# Patient Record
Sex: FEMALE | URBAN - METROPOLITAN AREA
[De-identification: names, ages, dates, MRNs, and addresses within clinical notes are randomized per-mention and may not be internally consistent; named-entity substitution may affect disease eponyms.]

---

## 2017-02-16 ENCOUNTER — HOSPITAL ENCOUNTER (EMERGENCY)
Facility: CLINIC | Age: 42
Discharge: HOME | End: 2017-02-16
Attending: EMERGENCY MEDICINE | Admitting: EMERGENCY MEDICINE

## 2017-02-16 VITALS
SYSTOLIC BLOOD PRESSURE: 142 MMHG | WEIGHT: 184.08 LBS | TEMPERATURE: 97.5 F | OXYGEN SATURATION: 98 % | RESPIRATION RATE: 16 BRPM | DIASTOLIC BLOOD PRESSURE: 73 MMHG

## 2017-02-16 DIAGNOSIS — H81.399 PERIPHERAL VERTIGO, UNSPECIFIED LATERALITY: ICD-10-CM

## 2017-02-16 LAB — INTERPRETATION ECG - MUSE: NORMAL

## 2017-02-16 PROCEDURE — 93005 ELECTROCARDIOGRAM TRACING: CPT | Performed by: EMERGENCY MEDICINE

## 2017-02-16 PROCEDURE — 99283 EMERGENCY DEPT VISIT LOW MDM: CPT | Mod: 25 | Performed by: EMERGENCY MEDICINE

## 2017-02-16 PROCEDURE — 93010 ELECTROCARDIOGRAM REPORT: CPT | Mod: Z6 | Performed by: EMERGENCY MEDICINE

## 2017-02-16 PROCEDURE — 99283 EMERGENCY DEPT VISIT LOW MDM: CPT | Performed by: EMERGENCY MEDICINE

## 2017-02-16 RX ORDER — IVABRADINE 5 MG/1
5 TABLET, FILM COATED ORAL 2 TIMES DAILY WITH MEALS
COMMUNITY

## 2017-02-16 ASSESSMENT — ENCOUNTER SYMPTOMS
COLOR CHANGE: 0
WEAKNESS: 0
FEVER: 0
EYE REDNESS: 0
DIZZINESS: 1
CONFUSION: 0
SHORTNESS OF BREATH: 0
HEADACHES: 0
ABDOMINAL PAIN: 0
DIFFICULTY URINATING: 0
NECK STIFFNESS: 0
ARTHRALGIAS: 0

## 2017-02-16 NOTE — ED NOTES
Patricia presents from work with c/o vertigo like symptoms since this AM. She states two episodes of room spinning dizziness today when standing or moves her head quickly, and says it does not happen at rest. She is here from [facility] on a work trip. Reports a history of a cardiac dysrhythmia, takes Ivabradine daily. Denies chest pain, SOB, nausea or other concerns.

## 2017-02-16 NOTE — DISCHARGE INSTRUCTIONS
Please make an appointment to follow up with [facility] (phone: [phone]) as soon as possible.               *BENIGN POSITIONAL VERTIGO         The inner ear is located behind the middle ear. It is a part of the balance center of the body. It contains small calcium particles within fluid filled canals (semi-circular canals). These particles can move out of position as a result of aging, head trauma or disease of the inner ear. Once that happens, movement of the head into certain positions may cause the particles to stimulate the inner ear and create the feeling of vertigo.  Vertigo is a false feeling of motion (as if you or the room is spinning). A vertigo attack may cause sudden nausea, vomiting and heavy sweating. Severe vertigo causes a loss of balance and may result in falling. During an attack of vertigo, head movement and body position changes will worsen symptoms.  An episode of vertigo may last seconds, minutes or hours. Once you are over the first episode of vertigo, it may never return. Sometimes symptoms recur off and on over several weeks or longer.  HOME CARE:    If symptoms are severe, rest quietly in bed. Change positions slowly. There is usually one position that will feel best, such as lying on one side or lying on your back with your head slightly raised on pillows.    Do not drive or work with dangerous machinery for one week after symptoms disappear, in case of a sudden return of symptoms.    Take medicine as prescribed to relieve your symptoms. Unless another medicine was prescribed for nausea, vomiting and vertigo, you may use over-the-counter motion sickness pills, such as meclizine (Bonine, Bonamine, Antivert) or dimenhydrinate (Dramamine).  FOLLOW UP with your doctor or as directed by our staff. Report any persistent ringing in the ear or hearing loss to your doctor.  [NOTE: If you had a CT or MRI scan, it will be reviewed by a specialist. You will be notified of any new  findings that may affect your care.]  GET PROMPT MEDICAL ATTENTION if any of the following occur:    Worsening of vertigo not controlled by the medicine prescribed    Repeated vomiting not controlled by the medicine prescribed    Increased weakness or fainting    Severe headache or unusual drowsiness or confusion    Weakness of an arm or leg or one side of the face    Difficulty with speech or vision    Seizure    [id number] [employer], [address] [place] [address]. All rights reserved. This information is not intended as a substitute for professional medical care. Always follow your healthcare professional's instructions.

## 2017-02-16 NOTE — ED AVS SNAPSHOT
[facility], [place], Emergency Department    [address] [address]    Phone:  [phone]                                       Patricia Maldonado   MRN: [id number]    Department:  [facility], Emergency Department   Date of Visit:  2/16/2017           After Visit Summary Signature Page     I have received my discharge instructions, and my questions have been answered. I have discussed any challenges I see with this plan with the nurse or doctor.    ..........................................................................................................................................  Patient/Patient Representative Signature      ..........................................................................................................................................  Patient Representative Print Name and Relationship to Patient    ..................................................               ................................................  Date                                            Time    ..........................................................................................................................................  Reviewed by Signature/Title    ...................................................              ..............................................  Date                                                            Time

## 2017-02-16 NOTE — ED PROVIDER NOTES
"  History     Chief Complaint   Patient presents with     Dizziness     HPI  Patricia Maldonado is a 41 year old female with history of dysrhythmia who presents to the ED today from work complaining of dizziness and vertigo like symptoms since this morning. The patient is a  who is here from [facility] on a work trip. Patient states she woke up at 8 AM this morning with dizziness and the sensation of the room spinning. She reports about 2 episodes of room spinning dizziness today when standing for long periods of time or moving her head too quickly. She denies dizziness at this time. She denies any numbness or weakness in her fingers. Patient traveled here from [facility] 10 days ago and traveled to [place] 3 days ago. She states her ears did feel plugged when flying back from [place] to [place]. About 3 months ago, patient states her doctor in [facility] started her on  Ivabradine to \"lower her cardiac rhythm\" due to cardiac dysrthythmia. Patient denies chest pain or shortness of breath.         Turning head sided ti s  I have reviewed the Medications, Allergies, Past Medical and Surgical History, and Social History in the [employer] system.    PAST MEDICAL HISTORY:   Past Medical History   Diagnosis Date     Dysrhythmia        PAST SURGICAL HISTORY: No past surgical history on file.    FAMILY HISTORY: No family history on file.    SOCIAL HISTORY:   Social History   Substance Use Topics     Smoking status: Never Smoker     Smokeless tobacco: Not on file     Alcohol use Not on file     No current facility-administered medications for this encounter.      Current Outpatient Prescriptions   Medication     ivabradine (CORLANOR) 5 MG tablet      No Known Allergies      Review of Systems   Constitutional: Negative for fever.   HENT: Negative for congestion.    Eyes: Negative for redness.   Respiratory: Negative for shortness of breath.    Cardiovascular: Negative for chest pain.   Gastrointestinal: Negative for abdominal pain. "   Genitourinary: Negative for difficulty urinating.   Musculoskeletal: Negative for arthralgias and neck stiffness.   Skin: Negative for color change.   Neurological: Positive for dizziness. Negative for weakness and headaches.   Psychiatric/Behavioral: Negative for confusion.   All other systems reviewed and are negative.      Physical Exam   BP: 142/73  Heart Rate: 86  Temp: 97.5  F (36.4  C)  Resp: 18  Weight: 83.5 kg (184 lb 1.4 oz)  SpO2: 98 %  Physical Exam   Constitutional: She is oriented to person, place, and time. No distress.   HENT:   Head: Atraumatic.   Mouth/Throat: Oropharynx is clear and moist. No oropharyngeal exudate.   Eyes: Pupils are equal, round, and reactive to light. No scleral icterus.   Cardiovascular: Normal heart sounds and intact distal pulses.    Pulmonary/Chest: Breath sounds normal. No respiratory distress.   Abdominal: Soft. Bowel sounds are normal. There is no tenderness.   Musculoskeletal: She exhibits no edema or tenderness.   Neurological: She is alert and oriented to person, place, and time. She has normal strength. No cranial nerve deficit or sensory deficit. GCS eye subscore is 4. GCS verbal subscore is 5. GCS motor subscore is 6.   Normal Hints exam   Skin: Skin is warm. No rash noted. She is not diaphoretic.       ED Course     ED Course     Procedures       11:10 AM  The patient was seen and examined by Dr. Loza in Room 14.          EKG Interpretation:      Interpreted by Valente Loza  Time reviewed: 1040  Symptoms at time of EKG: Dizziness   Rhythm: normal sinus   Rate: normal  Axis: normal  Ectopy: none  Conduction: normal  ST Segments/ T Waves: No ST-T wave changes  Q Waves: none  Comparison to prior: No old EKG available    Clinical Impression: normal EKG          Critical Care time:  none               Labs Ordered and Resulted from Time of ED Arrival Up to the Time of Departure from the ED - No data to display    Assessments & Plan (with Medical  Decision Making)   The patient had an episode of vertigo which is now resolved.  It was entirely positional and not associated with tinnitus or any other weakness or numbness.  She has a normal hints exam.  She has no focal neurologic deficit and no headache or head trauma.  The patient feels well enough to go home.  She is due to go back to [place] in 2 weeks and will follow up with an ENT at home.    I have reviewed the nursing notes.    I have reviewed the findings, diagnosis, plan and need for follow up with the patient.    Discharge Medication List as of 2/16/2017 11:52 AM          Final diagnoses:   Peripheral vertigo, unspecified laterality     IKatja , am serving as a trained medical scribe to document services personally performed by Jayce Lala MD, based on the provider's statements to me.   Jayce SINGH MD, was physically present and have reviewed and verified the accuracy of this note documented by Katja Romero.    2/16/2017   [facility], [place], EMERGENCY DEPARTMENT     Valente Loza MD  02/16/17 7145

## 2017-02-16 NOTE — ED AVS SNAPSHOT
[facility], Emergency Department    [address] [address]    Phone:  [phone]                                       Patricia Maldonado   MRN: [id number]    Department:  [facility], Emergency Department   Date of Visit:  2/16/2017           Patient Information     Date Of Birth          1975        Your diagnoses for this visit were:     Peripheral vertigo, unspecified laterality        You were seen by Valente Loza MD.        Discharge Instructions       Please make an appointment to follow up with [facility] (phone: [phone]) as soon as possible.               *BENIGN POSITIONAL VERTIGO         The inner ear is located behind the middle ear. It is a part of the balance center of the body. It contains small calcium particles within fluid filled canals (semi-circular canals). These particles can move out of position as a result of aging, head trauma or disease of the inner ear. Once that happens, movement of the head into certain positions may cause the particles to stimulate the inner ear and create the feeling of vertigo.  Vertigo is a false feeling of motion (as if you or the room is spinning). A vertigo attack may cause sudden nausea, vomiting and heavy sweating. Severe vertigo causes a loss of balance and may result in falling. During an attack of vertigo, head movement and body position changes will worsen symptoms.  An episode of vertigo may last seconds, minutes or hours. Once you are over the first episode of vertigo, it may never return. Sometimes symptoms recur off and on over several weeks or longer.  HOME CARE:    If symptoms are severe, rest quietly in bed. Change positions slowly. There is usually one position that will feel best, such as lying on one side or lying on your back with your head slightly raised on pillows.    Do not drive or work with dangerous machinery for one week after symptoms disappear, in case of a sudden return of  symptoms.    Take medicine as prescribed to relieve your symptoms. Unless another medicine was prescribed for nausea, vomiting and vertigo, you may use over-the-counter motion sickness pills, such as meclizine (Bonine, Bonamine, Antivert) or dimenhydrinate (Dramamine).  FOLLOW UP with your doctor or as directed by our staff. Report any persistent ringing in the ear or hearing loss to your doctor.  [NOTE: If you had a CT or MRI scan, it will be reviewed by a specialist. You will be notified of any new findings that may affect your care.]  GET PROMPT MEDICAL ATTENTION if any of the following occur:    Worsening of vertigo not controlled by the medicine prescribed    Repeated vomiting not controlled by the medicine prescribed    Increased weakness or fainting    Severe headache or unusual drowsiness or confusion    Weakness of an arm or leg or one side of the face    Difficulty with speech or vision    Seizure    [id number] [employer], [address] [place] [address]. All rights reserved. This information is not intended as a substitute for professional medical care. Always follow your healthcare professional's instructions.      24 Hour Appointment Hotline       To make an appointment at any [facility], call [phone] ([phone]). If you don't have a family doctor or clinic, we will help you find one. [facility] clinics are conveniently located to serve the needs of you and your family.             Review of your medicines      Our records show that you are taking the medicines listed below. If these are incorrect, please call your family doctor or clinic.        Dose / Directions Last dose taken    ivabradine 5 MG tablet   Commonly known as:  CORLANOR   Dose:  5 mg        Take 5 mg by mouth 2 times daily (with meals)   Refills:  0                Procedures and tests performed during your visit     EKG 12-lead, tracing only      Orders Needing Specimen Collection     None      Pending  "Results     No orders found from 2017 to 2017.            Pending Culture Results     No orders found from 2017 to 2017.            Thank you for choosing [facility]       Thank you for choosing [facility] for your care. Our goal is always to provide you with excellent care. Hearing back from our patients is one way we can continue to improve our services. Please take a few minutes to complete the written survey that you may receive in the mail after you visit with us. Thank you!        [employer] Online Information     [employer] lets you send messages to your doctor, view your test results, renew your prescriptions, schedule appointments and more. To sign up, go to [email or web address] . Click on \"Log in\" on the left side of the screen, which will take you to the Welcome page. Then click on \"Sign up Now\" on the right side of the page.     You will be asked to enter the access code listed below, as well as some personal information. Please follow the directions to create your username and password.     Your access code is: [email or web address]  Expires: 2017 11:37 AM     Your access code will  in 90 days. If you need help or a new code, please call your [facility] clinic or [phone].        Care EveryWhere ID     This is your Care EveryWhere ID. This could be used by other organizations to access your [facility] medical records  [id number]        After Visit Summary       This is your record. Keep this with you and show to your community pharmacist(s) and doctor(s) at your next visit.                  "